# Patient Record
Sex: FEMALE | URBAN - METROPOLITAN AREA
[De-identification: names, ages, dates, MRNs, and addresses within clinical notes are randomized per-mention and may not be internally consistent; named-entity substitution may affect disease eponyms.]

---

## 2021-07-08 ENCOUNTER — APPOINTMENT (RX ONLY)
Dept: URBAN - METROPOLITAN AREA CLINIC 55 | Facility: CLINIC | Age: 26
Setting detail: DERMATOLOGY
End: 2021-07-08

## 2021-07-08 DIAGNOSIS — Z41.9 ENCOUNTER FOR PROCEDURE FOR PURPOSES OTHER THAN REMEDYING HEALTH STATE, UNSPECIFIED: ICD-10-CM

## 2021-07-08 PROCEDURE — ? BOTOX

## 2021-07-08 NOTE — PROCEDURE: BOTOX
Additional Area 4 Units: 0
Show Nasal Units: Yes
Lot #: [id number]
Show Right And Left Brow Units: No
Additional Area 1 Units: 2
Glabellar Complex Units: 16
Consent: Written consent obtained. Patient denies pregnancy and breastfeeding. Risks include but not limited to lid/brow ptosis, bruising, swelling, diplopia, temporary effect, incomplete chemical denervation.
Dilution (U/0.1 Cc): 4
Expiration Date (Month Year): 8/2023
Additional Area 2 Location: chin
Additional Area 1 Location: upper lip
Adequate: hears normal conversation without difficulty
Post-Care Instructions: Patient instructed to not lie down for 4 hours post procedure and informed not to manipulate treatment area for 4 hours. \\nRecommended follow up in 7 days if needed.
Detail Level: Detailed
Price (Use Numbers Only, No Special Characters Or $): 680

## 2021-08-11 ENCOUNTER — APPOINTMENT (RX ONLY)
Dept: URBAN - METROPOLITAN AREA CLINIC 55 | Facility: CLINIC | Age: 26
Setting detail: DERMATOLOGY
End: 2021-08-11

## 2021-08-11 DIAGNOSIS — Z41.9 ENCOUNTER FOR PROCEDURE FOR PURPOSES OTHER THAN REMEDYING HEALTH STATE, UNSPECIFIED: ICD-10-CM

## 2021-08-11 PROCEDURE — ? FILLERS

## 2021-08-11 PROCEDURE — ? COSMETIC CONSULTATION: GENERAL

## 2021-08-11 NOTE — PROCEDURE: FILLERS
[place] Filler Volume In Cc: 0
Use Map Statement For Sites (Optional): No
Anesthesia Volume In Cc: 0.5
Post-Care Instructions: Patient instructed to apply ice to reduce swelling.
Include Cannula Size?: 27G
Filler: Restylane Kysse
Include Cannula Information In Note?: Yes
Additional Area 1 Location: lip body
Additional Anesthesia Volume In Cc: 6
Detail Level: Detailed
Map Statment: See Attach Map for Complete Details
Lot #: [id number]
Expiration Date (Month Year): 01/31/2024
Consent: Verbal consent obtained. Risks include but not limited to bruising, beading, irregular texture, ulceration, infection, allergic reaction, scar formation, incomplete augmentation, temporary nature, procedural pain.
Expiration Date (Month Year): 11/30/22
Anesthesia Type: 1% lidocaine with epinephrine
Lot #: [id number]

## 2022-03-15 ENCOUNTER — APPOINTMENT (RX ONLY)
Dept: URBAN - METROPOLITAN AREA CLINIC 55 | Facility: CLINIC | Age: 27
Setting detail: DERMATOLOGY
End: 2022-03-15

## 2022-03-15 DIAGNOSIS — Z41.9 ENCOUNTER FOR PROCEDURE FOR PURPOSES OTHER THAN REMEDYING HEALTH STATE, UNSPECIFIED: ICD-10-CM

## 2022-03-15 PROCEDURE — ? BOTOX

## 2022-03-15 NOTE — PROCEDURE: BOTOX
Show Periorbital Units: Yes
Lot #: [id number]
Show Ucl Units: No
Expiration Date (Month Year): 4/2024
Right Pupillary Line Units: 0
Additional Area 4 Location: upper cutaneous lip
Post-Care Instructions: Patient instructed to not lie down for 4 hours and limit physical activity for 24 hours.
Glabellar Complex Units: 16
Detail Level: Detailed
Dilution (U/0.1 Cc): 4
Consent: Written consent obtained. Risks include but not limited to lid/brow ptosis, bruising, swelling, diplopia, temporary effect, incomplete chemical denervation.
Additional Area 2 Units: 2
Additional Area 2 Location: Upper lip
Additional Area 3 Location: Chin
Additional Area 1 Location: Lateral Brows

## 2022-06-21 ENCOUNTER — APPOINTMENT (RX ONLY)
Dept: URBAN - METROPOLITAN AREA CLINIC 55 | Facility: CLINIC | Age: 27
Setting detail: DERMATOLOGY
End: 2022-06-21

## 2022-06-21 DIAGNOSIS — Z41.9 ENCOUNTER FOR PROCEDURE FOR PURPOSES OTHER THAN REMEDYING HEALTH STATE, UNSPECIFIED: ICD-10-CM

## 2022-06-21 PROCEDURE — ? FILLERS

## 2022-06-21 NOTE — PROCEDURE: FILLERS
Additional Area 1 Volume In Cc: 0
Additional Area 1 Location: lip body
Expiration Date (Month Year): 5/31/2024
Anesthesia Volume In Cc: 0.5
Anesthesia Type: 1% lidocaine with epinephrine
Lot #: [id number]
Lot #: [id number]
Include Cannula Size?: 27G
Include Cannula Information In Note?: No
Map Statment: See Attach Map for Complete Details
Include Cannula Information In Note?: Yes
Additional Anesthesia Volume In Cc: 6
Post-Care Instructions: Patient instructed to apply ice to reduce swelling.
Filler: Restylane Kysse
Aspiration Statement: Aspiration was performed prior to injecting site with filler.
Lot #: [id number]
Consent: Verbal consent obtained. Risks include but not limited to bruising, beading, irregular texture, ulceration, infection, allergic reaction, scar formation, incomplete augmentation, temporary nature, procedural pain.
Expiration Date (Month Year): 8/31/2023
Detail Level: Detailed
Expiration Date (Month Year): 11/11/2022

## 2023-05-16 ENCOUNTER — APPOINTMENT (RX ONLY)
Dept: URBAN - METROPOLITAN AREA CLINIC 55 | Facility: CLINIC | Age: 28
Setting detail: DERMATOLOGY
End: 2023-05-16

## 2023-05-16 DIAGNOSIS — Z41.9 ENCOUNTER FOR PROCEDURE FOR PURPOSES OTHER THAN REMEDYING HEALTH STATE, UNSPECIFIED: ICD-10-CM

## 2023-05-16 PROCEDURE — ? BOTOX

## 2023-05-16 NOTE — PROCEDURE: BOTOX
Show Ucl Units: No
Left Pupillary Line Units: 0
Consent: Verbal consent obtained. Risks include but not limited to lid/brow ptosis, bruising, swelling, diplopia, temporary effect, incomplete chemical denervation.
Show Additional Area 6: Yes
Glabellar Complex Units: 16
Post-Care Instructions: Patient instructed to not lie down for 4 hours and limit physical activity for 24 hours.
Dilution (U/0.1 Cc): 4
Additional Area 3 Location: lower lip
Additional Area 1 Location: upper lip
Additional Area 1 Units: 2
Additional Area 2 Location: chin
Incrementing Botox Units: By 0.5 Units
Detail Level: Detailed
Show Inventory Tab: Show